# Patient Record
Sex: FEMALE
[De-identification: names, ages, dates, MRNs, and addresses within clinical notes are randomized per-mention and may not be internally consistent; named-entity substitution may affect disease eponyms.]

---

## 2017-10-15 ENCOUNTER — TRANSCRIPTION ENCOUNTER (OUTPATIENT)
Age: 62
End: 2017-10-15

## 2021-07-18 ENCOUNTER — TRANSCRIPTION ENCOUNTER (OUTPATIENT)
Age: 66
End: 2021-07-18

## 2023-10-12 ENCOUNTER — NON-APPOINTMENT (OUTPATIENT)
Age: 68
End: 2023-10-12

## 2025-02-13 ENCOUNTER — EMERGENCY (EMERGENCY)
Facility: HOSPITAL | Age: 70
LOS: 1 days | Discharge: HOME | End: 2025-02-13
Attending: STUDENT IN AN ORGANIZED HEALTH CARE EDUCATION/TRAINING PROGRAM | Admitting: STUDENT IN AN ORGANIZED HEALTH CARE EDUCATION/TRAINING PROGRAM
Payer: MEDICARE

## 2025-02-13 VITALS
HEART RATE: 86 BPM | TEMPERATURE: 98 F | RESPIRATION RATE: 18 BRPM | DIASTOLIC BLOOD PRESSURE: 120 MMHG | OXYGEN SATURATION: 96 % | SYSTOLIC BLOOD PRESSURE: 184 MMHG

## 2025-02-13 VITALS
RESPIRATION RATE: 16 BRPM | HEART RATE: 10 BPM | SYSTOLIC BLOOD PRESSURE: 180 MMHG | DIASTOLIC BLOOD PRESSURE: 100 MMHG | WEIGHT: 171.08 LBS | TEMPERATURE: 98 F | OXYGEN SATURATION: 96 % | HEIGHT: 67 IN

## 2025-02-13 PROCEDURE — 99283 EMERGENCY DEPT VISIT LOW MDM: CPT

## 2025-02-13 PROCEDURE — 99282 EMERGENCY DEPT VISIT SF MDM: CPT

## 2025-02-13 RX ORDER — FLUORESCEIN SODIUM 0.6 MG/1
1 STRIP OPHTHALMIC ONCE
Refills: 0 | Status: DISCONTINUED | OUTPATIENT
Start: 2025-02-13 | End: 2025-02-17

## 2025-02-13 RX ORDER — TETRACAINE HCL 0.5 %
1 DROPS OPHTHALMIC (EYE) ONCE
Refills: 0 | Status: DISCONTINUED | OUTPATIENT
Start: 2025-02-13 | End: 2025-02-17

## 2025-02-13 NOTE — ED PROVIDER NOTE - PATIENT PORTAL LINK FT
You can access the FollowMyHealth Patient Portal offered by [facility] by registering at the following website: [email or web address]. By joining [facility]’s FollowMyHealth portal, you will also be able to view your health information using other applications (apps) compatible with our system.

## 2025-02-13 NOTE — ED PROVIDER NOTE - NSFOLLOWUPINSTRUCTIONS_ED_ALL_ED_FT
Photosensitivity    WHAT YOU NEED TO KNOW:    What is photosensitivity? Photosensitivity is a skin reaction caused or worsened by sunlight.    What increases my risk for photosensitivity?    Certain medicines, such as antibiotics, birth control pills, or diuretics    Certain chemicals, such as those in perfume, dye, or disinfecting cleaning products    A family history of photosensitivity    An autoimmune disease, such as lupus or Sjögren syndrome  What are the signs and symptoms of photosensitivity? Your signs and symptoms may start within minutes to 5 days after sun exposure. They may go away within 24 hours or last 1 week or more. You may have any of the following:    Itchy or painful, red rash on skin exposed to the sun    Red bumps on your skin    Hives    Blisters or swelling  How is photosensitivity diagnosed? Your healthcare provider will ask about your signs and symptoms and examine your skin. Tell your provider about medicines you take or perfume, dye, or disinfecting cleaning products you use. You may need any of the following:    Phototesting and photopatch testing are procedures that expose an area of your skin to artificial light. These tests may show if your symptoms were caused by light alone or by light and a chemical.    A blood or urine test may show a medical condition that is causing your symptoms.    A skin biopsy may show a skin condition such as rosacea that is causing your symptoms.  How is photosensitivity treated?    Steroids may help decrease itching and inflammation.    Antihistamines may help decrease itching.    NSAIDs, such as ibuprofen, help decrease swelling, pain, and fever. This medicine is available with or without a doctor's order. NSAIDs can cause stomach bleeding or kidney problems in certain people. If you take blood thinner medicine, always ask if NSAIDs are safe for you. Always read the medicine label and follow directions. Do not give these medicines to children younger than 6 months without direction from a healthcare provider.    Phototherapy is used to expose your skin to doses of ultraviolet light. This may help your skin adjust to the sunlight and help prevent skin reactions.  How can I manage or prevent a skin reaction?  Prevent Sun Damage    Soothe your skin. Apply a cool, damp cloth to the rash area. This may also help relieve itching. Use lukewarm water when you bathe. Pat your skin dry. Do not rub your skin with a towel. Use detergents, soaps, and shampoos made for sensitive skin.    Apply sunscreen before you go outside. Use a broad spectrum, waterproof sunscreen with an SPF of at least 50. Apply sunscreen even on cloudy or cool days. Reapply sunscreen every 2 hours.    Avoid direct sunlight between 10 am and 3 pm. This is when ultraviolet light is strongest. Sit in the shade, if possible. Wear long sleeves, pants, or long skirts when you must be in the sun. Cover your skin as much as possible if no shade is available.    Wear sunglasses and a hat when you are outside. Wear a hat with a wide brim all the way around to protect your face, ears, and neck. Sunglasses with UVA/UVB protection can help protect your eyes and the skin around your eyes.    Do not use tanning beds or other artificial light devices. These are not safer than direct sunlight and may cause a reaction.  When should I seek immediate care?    You have severe pain.    When should I call my doctor?    You have a fever.    Your rash spreads and covers large parts of your body.    Your rash starts to turn into blisters.    Your symptoms do not get better, or get worse, even after treatment.    You have a rash on your cheeks and nose that looks like a butterfly.    Your skin bruises easily.    You have questions or concerns about your condition or care.  CARE AGREEMENT:    You have the right to help plan your care. Learn about your health condition and how it may be treated. Discuss treatment options with your healthcare providers to decide what care you want to receive. You always have the right to refuse treatment.

## 2025-02-13 NOTE — ED ADULT NURSE NOTE - CHIEF COMPLAINT QUOTE
poked left eye with mascara and has redness to left eye and light sensitivity VA right eye20/50 and VA left eye corrected

## 2025-02-13 NOTE — ED PROVIDER NOTE - CLINICAL SUMMARY MEDICAL DECISION MAKING FREE TEXT BOX
Left eye with very minimal erythema over the nasal aspect but no other area of erythema.  Pupils are equal and no pain with extraocular movement.  Vision at baseline per patient.  Photophobia since resolved.  No evidence of fluorescein uptake.  Possible symptoms from recent trauma though history and exam otherwise not consistent with corneal abrasion, or iritis.  States patient is at baseline she does not have any floaters or persistent photophobia.  Will provide ophthalmology follow-up and plan for discharge.

## 2025-02-13 NOTE — ED PROVIDER NOTE - OBJECTIVE STATEMENT
Patient with no significant past medical history is presenting with intermittent photophobia from the left eye.  States earlier this week she poked herself in the eye with her mascara, had foreign body sensation time with intermittent photophobia.  Symptoms had improved and returned over the course of the week.  States they come and go throughout the day.  At the current moment states she feels back to normal.  She does wear glasses but no contacts.  States that she has an old prescription on her glasses but her vision feels at baseline.  No longer with foreign body sensation.  No other pain or trauma.

## 2025-02-13 NOTE — ED PROVIDER NOTE - PHYSICAL EXAMINATION
Constitutional: Awake, Alert, non-toxic. No acute distress.  HEAD: Normocephalic, atraumatic.   EYES: EOM intact, very mild scleral erythema to left eye over nasal aspect, PERRL, 20/50 corrected b/l with glasses, no fluorescein uptake noted, no longer with photosensitivity, no pain with EOM.  ENT: External ears normal. No rhinorrhea, no tracheal deviation   NECK: Supple, non-tender  CARDIOVASCULAR: regular rate  RESPIRATORY: Normal respiratory effort; Speaking in full sentences. No accessory muscle use.   MSK:  no lower extremity edema, no deformities  SKIN: Warm, dry  NEURO: A&O x3. Sensory and motor functions are grossly intact. Speech is normal.  PSYCH: Appearance and judgement seem appropriate for gender and age.

## 2025-02-13 NOTE — ED PROVIDER NOTE - NSFOLLOWUPCLINICS_GEN_ALL_ED_FT
[facility]  Ophthalmology  [address] [place] [address], [facility] [address]  [place] [address]  Phone: [phone]  Fax: